# Patient Record
Sex: FEMALE | Race: WHITE | NOT HISPANIC OR LATINO
[De-identification: names, ages, dates, MRNs, and addresses within clinical notes are randomized per-mention and may not be internally consistent; named-entity substitution may affect disease eponyms.]

---

## 2020-11-03 ENCOUNTER — NON-APPOINTMENT (OUTPATIENT)
Age: 70
End: 2020-11-03

## 2020-11-04 ENCOUNTER — NON-APPOINTMENT (OUTPATIENT)
Age: 70
End: 2020-11-04

## 2020-11-04 DIAGNOSIS — I21.9 ACUTE MYOCARDIAL INFARCTION, UNSPECIFIED: ICD-10-CM

## 2020-11-04 PROBLEM — Z00.00 ENCOUNTER FOR PREVENTIVE HEALTH EXAMINATION: Status: ACTIVE | Noted: 2020-11-04

## 2020-11-05 ENCOUNTER — NON-APPOINTMENT (OUTPATIENT)
Age: 70
End: 2020-11-05

## 2021-11-15 ENCOUNTER — APPOINTMENT (OUTPATIENT)
Dept: NEPHROLOGY | Facility: CLINIC | Age: 71
End: 2021-11-15
Payer: MEDICARE

## 2021-11-15 ENCOUNTER — RESULT REVIEW (OUTPATIENT)
Age: 71
End: 2021-11-15

## 2021-11-15 VITALS
HEIGHT: 63 IN | DIASTOLIC BLOOD PRESSURE: 70 MMHG | BODY MASS INDEX: 42.52 KG/M2 | HEART RATE: 70 BPM | TEMPERATURE: 97.5 F | SYSTOLIC BLOOD PRESSURE: 140 MMHG | WEIGHT: 240 LBS | OXYGEN SATURATION: 98 %

## 2021-11-15 DIAGNOSIS — I82.401 ACUTE EMBOLISM AND THROMBOSIS OF UNSPECIFIED DEEP VEINS OF RIGHT LOWER EXTREMITY: ICD-10-CM

## 2021-11-15 DIAGNOSIS — Z85.42 PERSONAL HISTORY OF MALIGNANT NEOPLASM OF OTHER PARTS OF UTERUS: ICD-10-CM

## 2021-11-15 PROCEDURE — 99204 OFFICE O/P NEW MOD 45 MIN: CPT

## 2021-11-16 PROBLEM — Z85.42 HISTORY OF ENDOMETRIAL ADENOCARCINOMA: Status: RESOLVED | Noted: 2021-11-16 | Resolved: 2021-11-16

## 2021-11-16 NOTE — PHYSICAL EXAM
[General Appearance - Alert] : alert [General Appearance - In No Acute Distress] : in no acute distress [General Appearance - Well Nourished] : well nourished [Extraocular Movements] : extraocular movements were intact [Neck Appearance] : the appearance of the neck was normal [] : no respiratory distress [Respiration, Rhythm And Depth] : normal respiratory rhythm and effort [Exaggerated Use Of Accessory Muscles For Inspiration] : no accessory muscle use [Auscultation Breath Sounds / Voice Sounds] : lungs were clear to auscultation bilaterally [Heart Rate And Rhythm] : heart rate was normal and rhythm regular [Heart Sounds] : normal S1 and S2 [No CVA Tenderness] : no ~M costovertebral angle tenderness [Cranial Nerves] : cranial nerves 2-12 were intact [Oriented To Time, Place, And Person] : oriented to person, place, and time [FreeTextEntry1] : right LE chronic skin changes, no warmth, erythema

## 2021-11-16 NOTE — ASSESSMENT
[FreeTextEntry1] : 70 yo woman with PMHx of HTN, T2DM, Hypothyroidism, RLE DVT on xarelto, Endometrial ca s/p sx, chemo and radiation in 2018, Radiation cystitis presents for evaluation of proteinuria.\par \par #Proteinuria - will get renal panel, SPEP, IFx, FLC, urinalysis w/ micro, urine pcr, negative UPEP; renal/bladder sono\par \par #RLE edema - worsening with mild tenderness, hx of dvt on xarelto; will get b/l LE venous doppler to r/o new DVT; Vascular evaluation \par \par #Radiation cystitis - intermittent trace hematuria, repeat urinalysis w/ micro, Urology evaluation for possible cystoscopy \par \par #Hypertension - possible 2/2 lenvima, bp improving after medication dc'ed, admits to not taking amlodipine, bp 140/70 mmHg in the office; low salt diet, weight reduction, continue home bp monitoring and keep log\par \par #Obesity - exercise as tolerated and low calorie diet for weight reduction \par  \par follow up in 1 week

## 2021-11-16 NOTE — HISTORY OF PRESENT ILLNESS
[FreeTextEntry1] : 72 yo woman with PMHx of HTN, T2DM, Hypothyroidism, RLE DVT on xarelto, Endometrial ca s/p sx, chemo and radiation in 2018, Radiation cystitis presents for evaluation of proteinuria.\par \par patient was treated with lenvima, last dose 2 weeks, dc'ed due to proteinuria \par patient admits to worsening of chronic RLE edema with skin changes\par denies fever, chills, cough, cp, sob, n/v/d, abdominal or flank pain\par frothy urine, no dysuria or hematuria\par \par \par

## 2021-12-30 ENCOUNTER — APPOINTMENT (OUTPATIENT)
Dept: NEPHROLOGY | Facility: CLINIC | Age: 71
End: 2021-12-30

## 2022-01-24 ENCOUNTER — RESULT REVIEW (OUTPATIENT)
Age: 72
End: 2022-01-24

## 2022-01-24 ENCOUNTER — APPOINTMENT (OUTPATIENT)
Dept: NEPHROLOGY | Facility: CLINIC | Age: 72
End: 2022-01-24
Payer: MEDICARE

## 2022-01-24 PROCEDURE — P9615: CPT

## 2022-02-17 ENCOUNTER — RESULT REVIEW (OUTPATIENT)
Age: 72
End: 2022-02-17

## 2022-02-17 ENCOUNTER — APPOINTMENT (OUTPATIENT)
Dept: NEPHROLOGY | Facility: CLINIC | Age: 72
End: 2022-02-17
Payer: MEDICARE

## 2022-02-17 VITALS
BODY MASS INDEX: 42.88 KG/M2 | SYSTOLIC BLOOD PRESSURE: 134 MMHG | HEIGHT: 63 IN | TEMPERATURE: 99.2 F | HEART RATE: 85 BPM | DIASTOLIC BLOOD PRESSURE: 68 MMHG | WEIGHT: 242 LBS | OXYGEN SATURATION: 96 %

## 2022-02-17 DIAGNOSIS — E66.9 OBESITY, UNSPECIFIED: ICD-10-CM

## 2022-02-17 PROCEDURE — 99213 OFFICE O/P EST LOW 20 MIN: CPT

## 2022-02-17 PROCEDURE — 36415 COLL VENOUS BLD VENIPUNCTURE: CPT

## 2022-02-17 PROCEDURE — P9615: CPT

## 2022-02-17 NOTE — ASSESSMENT
[FreeTextEntry1] : 70 yo woman with PMHx of HTN, T2DM, Hypothyroidism, RLE DVT on xarelto, Endometrial ca s/p sx, chemo and radiation in 2018, Radiation cystitis is following for proteinuria.\par \par \par #Proteinuria - believed to be due to lenvima that was dc'ed; started on new chemo regimen (couldn't recall the name) with 1st dose 2/1/22, continue monthly; will obtain renal panel, cystatin C eGFR, uric acid, CPK, lytes, urinalysis w/ micro, urine pr/cr ratio; SPEP, IFx, FLC, UPEP;\par unremarkable renal sono in Oct 2021\par \par #RLE edema - hx of dvt on xarelto, stable  \par \par #Radiation cystitis - intermittent trace hematuria, repeat urinalysis w/ micro\par \par #Hypertension - bp at home runs in 117-130's/ 60-70's, no medication but couldn't recall the name; patient will call back with list of meds, but admits to not taking amlodipine; continue current regimen, low salt diet, weight reduction, continue home bp monitoring and keep log\par \par #Obesity - exercise as tolerated and low calorie diet for weight reduction \par  \par \par will call with results\par follow up in 3 months

## 2022-02-17 NOTE — HISTORY OF PRESENT ILLNESS
[FreeTextEntry1] : 72 yo woman with PMHx of HTN, T2DM, Hypothyroidism, RLE DVT on xarelto, Endometrial ca s/p sx, chemo and radiation in 2018, Radiation cystitis is following for proteinuria.\par \par \par covid infection in December 2021\par no changes in appetite, but weight gain with decreasing activity \par denies fever, chills, cough, cp, sob\par no n/v/d, abdominal or flank pain\par denies dysuria, frothy urine or hematuria\par stable chronic RLE edema with skin changes\par \par started on new chemo regimen (couldn't recall the name) with 1st dose 2/1/22, continue monthly\par was treated with lenvima that was dc'ed due to proteinuria \par \par checking bp at home stating it runs in 117-130's/ 60-70's, no medication but couldn't recall the name \par will call back with list of meds \par \par unremarkable renal sono in Oct 2021

## 2022-02-17 NOTE — PHYSICAL EXAM
[General Appearance - Alert] : alert [General Appearance - In No Acute Distress] : in no acute distress [General Appearance - Well Nourished] : well nourished [Extraocular Movements] : extraocular movements were intact [] : no respiratory distress [Respiration, Rhythm And Depth] : normal respiratory rhythm and effort [Exaggerated Use Of Accessory Muscles For Inspiration] : no accessory muscle use [Auscultation Breath Sounds / Voice Sounds] : lungs were clear to auscultation bilaterally [Heart Rate And Rhythm] : heart rate was normal and rhythm regular [Heart Sounds] : normal S1 and S2 [No CVA Tenderness] : no ~M costovertebral angle tenderness [Oriented To Time, Place, And Person] : oriented to person, place, and time [General Appearance - Well Developed] : well developed [No Focal Deficits] : no focal deficits [FreeTextEntry1] : right LE chronic skin changes, no warmth with mild erythema, not tender

## 2022-02-21 RX ORDER — METFORMIN HYDROCHLORIDE 1000 MG/1
1000 TABLET, COATED ORAL TWICE DAILY
Refills: 0 | Status: ACTIVE | COMMUNITY
Start: 2022-02-21

## 2022-02-21 RX ORDER — GABAPENTIN 100 MG/1
100 CAPSULE ORAL
Refills: 0 | Status: ACTIVE | COMMUNITY
Start: 2021-11-12

## 2022-02-21 RX ORDER — RIVAROXABAN 20 MG/1
20 TABLET, FILM COATED ORAL DAILY
Refills: 0 | Status: ACTIVE | COMMUNITY
Start: 2022-02-21

## 2022-02-21 RX ORDER — ATORVASTATIN CALCIUM 40 MG/1
40 TABLET, FILM COATED ORAL DAILY
Refills: 0 | Status: ACTIVE | COMMUNITY
Start: 2022-02-08

## 2022-02-21 RX ORDER — LISINOPRIL 5 MG/1
5 TABLET ORAL DAILY
Refills: 0 | Status: ACTIVE | COMMUNITY
Start: 2022-02-21

## 2022-02-21 RX ORDER — LEVOTHYROXINE SODIUM 0.15 MG/1
150 TABLET ORAL DAILY
Refills: 0 | Status: ACTIVE | COMMUNITY
Start: 2021-10-18

## 2022-02-21 RX ORDER — GLIMEPIRIDE 2 MG/1
2 TABLET ORAL DAILY
Refills: 0 | Status: ACTIVE | COMMUNITY
Start: 2022-02-21

## 2022-02-22 RX ORDER — ONDANSETRON 8 MG/1
8 TABLET ORAL
Refills: 0 | Status: DISCONTINUED | COMMUNITY
Start: 2022-01-26 | End: 2022-02-22

## 2022-02-24 DIAGNOSIS — Z87.440 PERSONAL HISTORY OF URINARY (TRACT) INFECTIONS: ICD-10-CM

## 2022-05-19 ENCOUNTER — APPOINTMENT (OUTPATIENT)
Dept: NEPHROLOGY | Facility: CLINIC | Age: 72
End: 2022-05-19
Payer: MEDICARE

## 2022-05-19 ENCOUNTER — RESULT REVIEW (OUTPATIENT)
Age: 72
End: 2022-05-19

## 2022-05-19 VITALS
TEMPERATURE: 97.9 F | HEIGHT: 63 IN | DIASTOLIC BLOOD PRESSURE: 60 MMHG | BODY MASS INDEX: 42.88 KG/M2 | OXYGEN SATURATION: 97 % | SYSTOLIC BLOOD PRESSURE: 108 MMHG | WEIGHT: 242 LBS | HEART RATE: 82 BPM

## 2022-05-19 DIAGNOSIS — N39.0 URINARY TRACT INFECTION, SITE NOT SPECIFIED: ICD-10-CM

## 2022-05-19 DIAGNOSIS — E11.9 TYPE 2 DIABETES MELLITUS W/OUT COMPLICATIONS: ICD-10-CM

## 2022-05-19 DIAGNOSIS — E03.9 HYPOTHYROIDISM, UNSPECIFIED: ICD-10-CM

## 2022-05-19 DIAGNOSIS — I10 ESSENTIAL (PRIMARY) HYPERTENSION: ICD-10-CM

## 2022-05-19 DIAGNOSIS — D64.9 ANEMIA, UNSPECIFIED: ICD-10-CM

## 2022-05-19 DIAGNOSIS — R60.0 LOCALIZED EDEMA: ICD-10-CM

## 2022-05-19 DIAGNOSIS — R80.9 PROTEINURIA, UNSPECIFIED: ICD-10-CM

## 2022-05-19 DIAGNOSIS — N30.40 IRRADIATION CYSTITIS W/OUT HEMATURIA: ICD-10-CM

## 2022-05-19 PROCEDURE — 99213 OFFICE O/P EST LOW 20 MIN: CPT

## 2022-05-19 RX ORDER — PEGFILGRASTIM 6 MG/.6ML
6 INJECTION SUBCUTANEOUS
Refills: 0 | Status: DISCONTINUED | COMMUNITY
Start: 2022-02-21 | End: 2022-05-19

## 2022-05-19 RX ORDER — LEVOFLOXACIN 500 MG/1
500 TABLET, FILM COATED ORAL
Qty: 5 | Refills: 0 | Status: DISCONTINUED | COMMUNITY
Start: 2022-02-25 | End: 2022-05-19

## 2022-05-19 NOTE — HISTORY OF PRESENT ILLNESS
[FreeTextEntry1] : 70 yo woman with PMHx of HTN, T2DM, Hypothyroidism, RLE DVT on xarelto, Endometrial ca s/p sx, chemo and radiation in 2018, Radiation cystitis is following for proteinuria and elevated blood pressure.\par \par \par no acute interim events\par continue monthly on new chemo since Feb 2022, better tolerated (couldn't recall the name) \par lenvima that was dc'ed due to proteinuria and hypertension\par no recent changes in medications \par denies NSAIDs use\par \par currently denies any active acute complaints\par no changes in taste or appetite, weight is stable \par +intermittent chest pain stress related\par no dizziness, sob, cp, n/v/d\par no abdominal or flank pain\par no dysuria, hematuria, +incontinent \par chronic RLE edema with skin changes 2/2 DVT\par \par hx of covid infection in December 2021\par \par unremarkable renal sono in Oct 2021\par \par no recent renal panel/ urinalysis

## 2022-05-19 NOTE — PHYSICAL EXAM
[General Appearance - Alert] : alert [General Appearance - In No Acute Distress] : in no acute distress [General Appearance - Well Nourished] : well nourished [General Appearance - Well Developed] : well developed [Extraocular Movements] : extraocular movements were intact [] : no respiratory distress [Respiration, Rhythm And Depth] : normal respiratory rhythm and effort [Exaggerated Use Of Accessory Muscles For Inspiration] : no accessory muscle use [Auscultation Breath Sounds / Voice Sounds] : lungs were clear to auscultation bilaterally [Heart Rate And Rhythm] : heart rate was normal and rhythm regular [Heart Sounds] : normal S1 and S2 [No CVA Tenderness] : no ~M costovertebral angle tenderness [No Focal Deficits] : no focal deficits [Oriented To Time, Place, And Person] : oriented to person, place, and time [Jugular Venous Distention Increased] : there was no jugular-venous distention [FreeTextEntry1] : right LE chronic skin changes, no warmth with mild erythema, not tender

## 2022-05-19 NOTE — ASSESSMENT
[FreeTextEntry1] : 72 yo woman with PMHx of HTN, T2DM, Hypothyroidism, RLE DVT on xarelto, Endometrial ca s/p sx, chemo and radiation in 2018, Radiation cystitis is following for proteinuria and elevated blood pressure.\par \par \par #Proteinuria - believed to be due to lenvima that was dc'ed; started on new chemo regimen (couldn't recall the name) with 1st dose in 2/1/22, continue monthly; negative MM workup; unremarkable renal sono in Oct 2021;\par no recent renal panel/ urinalysis\par - obtain renal panel, cystatin C eGFR\par - check electrolytes, uric acid, CPK\par - urinalysis w/ micro, ulytes, urine pr-cr ratio\par unremarkable renal sono in Oct 2021\par \par #Radiation cystitis/ Recurrent UTI's - intermittent trace hematuria, repeat urinalysis w/ micro\par \par #Hypertension - believed to be lenvima-related that was dc'ed \par - on lisinopril 5 mg qd, did not take anti-hypertensive today, bp 108/60 in the office\par - to avoid hypotension hold on lisinopril and continue home blood pressure monitoring \par - low salt diet, weight reduction and control \par \par #Obesity - exercise as tolerated and low calorie diet for weight reduction \par  \par #RLE edema - chronic, trace, due to DVT, continue AC \par \par #Anemia - check cbc, iron panel \par \par #Hypothyroidism - on Levothyroxine 150 mcg qam\par - check TSH, free T4\par \par #T2DM - on metformin 1000 bid, glimepiride 2mg qd for glycemic control \par - check HbA1c\par \par follow up in 3 months

## 2022-05-20 ENCOUNTER — TRANSCRIPTION ENCOUNTER (OUTPATIENT)
Age: 72
End: 2022-05-20

## 2023-02-09 ENCOUNTER — APPOINTMENT (OUTPATIENT)
Dept: SURGERY | Facility: CLINIC | Age: 73
End: 2023-02-09

## 2023-04-10 ENCOUNTER — TRANSCRIPTION ENCOUNTER (OUTPATIENT)
Age: 73
End: 2023-04-10